# Patient Record
Sex: MALE | Race: WHITE | NOT HISPANIC OR LATINO | URBAN - METROPOLITAN AREA
[De-identification: names, ages, dates, MRNs, and addresses within clinical notes are randomized per-mention and may not be internally consistent; named-entity substitution may affect disease eponyms.]

---

## 2018-06-11 NOTE — PATIENT DISCUSSION
(H18.659) Pannus (corneal), left eye - Assesment : Exam reveals corneal pannus OS. - Plan : Discussed possible contribution of condition from SCL usage, will address with optometrist in future. Denies sleeping in SCLs or overwearing. See Plan #1 for more.

## 2018-06-11 NOTE — PATIENT DISCUSSION
"(H10.89) Other conjunctivitis - Assesment : Examination revealed other significant conjunctivitis, likely viral. Patient was initially given Tobramycin with no improvement, switched to Tobradex, improved condition but worsened when D/Gil. Has not been on oral antibiotics until she was given Keflex on Saturday. Previously had significant eyelid edema. CT Scan performed, no evidence of orbital cellulitis. See EHR docs for further notes. - Plan : Explanation of condition given to patient, discussed all options. Steroid likely worsened symptoms. Patient to continue sulfacetamide OS QID for one more week then discontinue, continue PO Keflex as directed. \. Advised of need to maintain contagious precautions and avoid the use of ""get the red out"" drops if possible.  Stay out of SCLs for one week

## 2018-06-11 NOTE — PATIENT DISCUSSION
(D07.658) Edema of left upper eyelid - Assesment : Examination revealed edema of the eyelid secondary to a conjunctivitis. - Plan : See Plan #1.

## 2018-06-19 NOTE — PATIENT DISCUSSION
(E21.530) Pannus (corneal), left eye - Assesment : Exam reveals corneal pannus OS. - Plan : Discussed possible contribution of condition from SCL usage, denies sleeping in SCLs or overwearing. CL's might be too tight.  See Plan #1

## 2018-07-11 NOTE — PATIENT DISCUSSION
(S05. 02XA) Inj conjunctiva and corneal abrasion w/o fb, left eye, init - Assesment : Examination revealed marginal corneal abrasion vs ulcer. Will cover with antibiotics, no infiltrates noted today. Pt reports issue with CL, may be poor fit. Pt has upcoming appt with Jagruti Parra for BTC Trip. Pt leaving to go out of town and will return 7/23. - Plan : Start Zylet qid OS for 7-10 days. E-Rx sent to pharmacy and coupon given to Pt. Recommended Refresh q 1-2 hours WA and Refresh Gel qhs; coupon given today. Discontinue CL wear. Discussed possibility of poor fit. Advised if not feeling any better or feeling worse, call for an appt tomorrow. Pt to follow with someone while out of town if symptoms worsen, do not improve, or new symptoms or vision changes occur. RTC here on 7/23 for IOP Check, sooner if problems.

## 2018-07-23 NOTE — PATIENT DISCUSSION
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 Staining pooling at edge 2mm High And  6.5 MM wide at inferior limbus Recommed patient to follow up with cornea specialist at this time for further investigation - Plan : Monitor for changes Continue of contact lens wear OS Continue gel QHS OS Stop Zylet Rtc PRN for exams. Corneal consult. Fundus photo taken.

## 2020-10-12 ENCOUNTER — IMPORTED ENCOUNTER (OUTPATIENT)
Dept: URBAN - METROPOLITAN AREA CLINIC 31 | Facility: CLINIC | Age: 35
End: 2020-10-12

## 2020-10-12 PROBLEM — H53.021: Noted: 2020-10-12

## 2020-10-12 PROCEDURE — 92015 DETERMINE REFRACTIVE STATE: CPT

## 2020-10-12 PROCEDURE — 92004 COMPRE OPH EXAM NEW PT 1/>: CPT

## 2020-11-19 ENCOUNTER — IMPORTED ENCOUNTER (OUTPATIENT)
Dept: URBAN - METROPOLITAN AREA CLINIC 31 | Facility: CLINIC | Age: 35
End: 2020-11-19

## 2021-12-02 ENCOUNTER — IMPORTED ENCOUNTER (OUTPATIENT)
Dept: URBAN - METROPOLITAN AREA CLINIC 31 | Facility: CLINIC | Age: 36
End: 2021-12-02

## 2021-12-02 PROCEDURE — 92014 COMPRE OPH EXAM EST PT 1/>: CPT

## 2021-12-02 PROCEDURE — 92015 DETERMINE REFRACTIVE STATE: CPT

## 2021-12-02 NOTE — PATIENT DISCUSSION
1.  Refractive error/Amblyopia OD. S?P strab Sx2. Astigmatism Annual Good ocular health documented. Discussed options of glasses contacts or refractive surgery. Discussed importance of annual eye exams. Rx specs part-time.

## 2022-04-02 ASSESSMENT — TONOMETRY
OD_IOP_MMHG: 21
OS_IOP_MMHG: 21

## 2022-04-02 ASSESSMENT — VISUAL ACUITY
OS_SC: 20/20-3
OD_SC: 20/25-3
OD_CC: J112''
OD_SC: J114''
OS_SC: J1+12''
OD_PH: SC 20/30
OD_CC: 20/40-2
OS_CC: 20/25-1
OS_SC: J1+14''
OD_CC: 20/60-2
OD_PH: SC 20/30 +3
OS_CC: J1+12''
OS_CC: 20/30-1
OD_CC: 20/30-2
OD_SC: J112''
OS_CC: 20/25-2

## 2022-07-30 ENCOUNTER — TELEPHONE ENCOUNTER (OUTPATIENT)
Age: 37
End: 2022-07-30

## 2022-07-31 ENCOUNTER — TELEPHONE ENCOUNTER (OUTPATIENT)
Age: 37
End: 2022-07-31

## 2023-08-08 ENCOUNTER — COMPREHENSIVE EXAM (OUTPATIENT)
Dept: URBAN - METROPOLITAN AREA CLINIC 31 | Facility: CLINIC | Age: 38
End: 2023-08-08

## 2023-08-08 DIAGNOSIS — H52.223: ICD-10-CM

## 2023-08-08 DIAGNOSIS — H52.13: ICD-10-CM

## 2023-08-08 PROCEDURE — 92014 COMPRE OPH EXAM EST PT 1/>: CPT

## 2023-08-08 ASSESSMENT — TONOMETRY
OD_IOP_MMHG: 16
OS_IOP_MMHG: 15

## 2023-08-08 ASSESSMENT — VISUAL ACUITY
OD_SC: 20/40-1
OS_SC: 20/20-2

## 2024-12-27 ENCOUNTER — OFFICE VISIT (OUTPATIENT)
Dept: URBAN - METROPOLITAN AREA CLINIC 60 | Facility: CLINIC | Age: 39
End: 2024-12-27
Payer: COMMERCIAL

## 2024-12-27 ENCOUNTER — DASHBOARD ENCOUNTERS (OUTPATIENT)
Age: 39
End: 2024-12-27

## 2024-12-27 VITALS
DIASTOLIC BLOOD PRESSURE: 66 MMHG | HEART RATE: 82 BPM | SYSTOLIC BLOOD PRESSURE: 118 MMHG | HEIGHT: 69 IN | WEIGHT: 210.2 LBS | OXYGEN SATURATION: 96 % | BODY MASS INDEX: 31.13 KG/M2 | TEMPERATURE: 97.2 F | RESPIRATION RATE: 20 BRPM

## 2024-12-27 DIAGNOSIS — K64.8 OTHER HEMORRHOIDS: ICD-10-CM

## 2024-12-27 DIAGNOSIS — R13.19 OTHER DYSPHAGIA: ICD-10-CM

## 2024-12-27 DIAGNOSIS — R10.13 DYSPEPSIA: ICD-10-CM

## 2024-12-27 PROCEDURE — 99204 OFFICE O/P NEW MOD 45 MIN: CPT

## 2024-12-27 RX ORDER — HYDROCORTISONE 10 MG/G
1 APPLICATION CREAM TOPICAL TWICE A DAY
Qty: 1 EACH | Refills: 1 | OUTPATIENT
Start: 2024-12-27

## 2024-12-27 RX ORDER — PANTOPRAZOLE SODIUM 40 MG/1
1 TABLET 1/2 TO 1 HOUR BEFORE MORNING MEAL TABLET, DELAYED RELEASE ORAL ONCE A DAY
Qty: 90 TABLET | Refills: 0 | OUTPATIENT
Start: 2024-12-27

## 2024-12-27 RX ORDER — LISINOPRIL 5 MG/1
TAKE ONE TABLET BY MOUTH ONE TIME DAILY TABLET ORAL
Qty: 30 UNSPECIFIED | Refills: 5 | Status: ACTIVE | COMMUNITY

## 2024-12-27 NOTE — HPI-TODAY'S VISIT:
Patient is a 39-year-old male who presents today as a new patient with complaints of trouble swallowing.  On 6//24 patient presented to an urgent care with abdominal pain and black stools.  He was diagnosed with GERD and prescribed Protonix and Pepcid.  He was also told to follow-up with GI for possible EGD.  At today's visit patient states that for the past 8 or 9 months he has been having trouble swallowing solid foods only.  He he states that he can feel them get stuck substernally before passing into the stomach.  He has not had any issues with fluids and symptoms do not seem to be worsening.  He is also not had any regurgitation.  He also states that occasionally he will have acid reflux symptoms as well.  He no longer has black stools and does admit that although the pantoprazole helped with his symptoms he ran out of the medication and never requested a referral from his PCP.  He has never had an EGD or colonoscopy before.  He also has complaints of hemorrhoidal irritation and has tried preparation H without much symptom relief.  He denies constipation, hard stools, straining to have a bowel movement and diarrhea. . Labs 2/27/2024 - Lipid panel: Cholesterol 207, triglycerides 158, , non- otherwise unremarkable - CMP within normal limits - Hemoglobin A1c within normal limits

## 2025-02-26 ENCOUNTER — OFFICE VISIT (OUTPATIENT)
Dept: URBAN - METROPOLITAN AREA CLINIC 60 | Facility: CLINIC | Age: 40
End: 2025-02-26

## 2025-04-29 ENCOUNTER — OFFICE VISIT (OUTPATIENT)
Dept: URBAN - METROPOLITAN AREA CLINIC 60 | Facility: CLINIC | Age: 40
End: 2025-04-29
Payer: COMMERCIAL

## 2025-04-29 DIAGNOSIS — R13.19 OTHER DYSPHAGIA: ICD-10-CM

## 2025-04-29 DIAGNOSIS — R10.13 DYSPEPSIA: ICD-10-CM

## 2025-04-29 PROCEDURE — 99214 OFFICE O/P EST MOD 30 MIN: CPT

## 2025-04-29 RX ORDER — PANTOPRAZOLE SODIUM 40 MG/1
1 TABLET 1/2 TO 1 HOUR BEFORE MORNING MEAL TABLET, DELAYED RELEASE ORAL ONCE A DAY
Qty: 90 TABLET | Refills: 0 | Status: ACTIVE | COMMUNITY

## 2025-04-29 RX ORDER — LISINOPRIL 5 MG/1
TAKE ONE TABLET BY MOUTH ONE TIME DAILY TABLET ORAL
Qty: 30 UNSPECIFIED | Refills: 5 | Status: ACTIVE | COMMUNITY

## 2025-04-29 RX ORDER — PANTOPRAZOLE SODIUM 20 MG/1
1 TABLET 1/2 TO 1 HOUR BEFORE MORNING MEAL TABLET, DELAYED RELEASE ORAL ONCE A DAY
Qty: 90 TABLET | Refills: 0

## 2025-04-29 NOTE — HPI-TODAY'S VISIT:
Patient is a 39-year-old male who presents today for follow-up on acid reflux symptoms and dysphagia.  At today's visit patient states he is taking pantoprazole 40 mg daily which is working well to control his acid reflux symptoms.  He also has not had any dysphagia since starting this medication.  Patient does admit that he tried to stop the pantoprazole however 3 days later he had a recurrence of acid reflux symptoms so he restarted the medication.  At this time patient would like to hold off on EGD.  Did discuss tapering medication with patient and he is interested in this.  He also states that he still has some hemorrhoidal irritation intermittently however he is still not ready for colorectal surgery referral for hemorrhoid removal.  . Labs 2/27/2024 - Lipid panel: Cholesterol 207, triglycerides 158, , non- otherwise unremarkable - CMP within normal limits - Hemoglobin A1c within normal limits

## 2025-07-24 ENCOUNTER — ERX REFILL RESPONSE (OUTPATIENT)
Dept: URBAN - METROPOLITAN AREA CLINIC 60 | Facility: CLINIC | Age: 40
End: 2025-07-24

## 2025-07-24 RX ORDER — PANTOPRAZOLE SODIUM 20 MG/1
TAKE ONE TABLET BY MOUTH ONE-HALF TO ONE HOUR BEFORE MORNING MEAL ONCE DAILY TABLET, DELAYED RELEASE ORAL
Qty: 90 TABLET | Refills: 0 | OUTPATIENT

## 2025-07-24 RX ORDER — PANTOPRAZOLE SODIUM 20 MG/1
1 TABLET 1/2 TO 1 HOUR BEFORE MORNING MEAL TABLET, DELAYED RELEASE ORAL ONCE A DAY
Qty: 90 TABLET | Refills: 0 | OUTPATIENT